# Patient Record
Sex: FEMALE | Race: WHITE | NOT HISPANIC OR LATINO | Employment: UNEMPLOYED | URBAN - METROPOLITAN AREA
[De-identification: names, ages, dates, MRNs, and addresses within clinical notes are randomized per-mention and may not be internally consistent; named-entity substitution may affect disease eponyms.]

---

## 2023-11-11 PROBLEM — F41.9 ANXIETY: Status: ACTIVE | Noted: 2023-11-11

## 2023-11-11 PROBLEM — N83.8: Status: ACTIVE | Noted: 2023-11-11

## 2023-11-11 PROBLEM — N94.6 DYSMENORRHEA: Status: ACTIVE | Noted: 2023-11-11

## 2023-11-11 PROBLEM — N80.129 ENDOMETRIOMA OF OVARY: Status: ACTIVE | Noted: 2023-11-11

## 2023-11-11 PROBLEM — Z87.42 HISTORY OF OVARIAN CYST: Status: ACTIVE | Noted: 2023-11-11

## 2023-11-11 PROBLEM — F32.A DEPRESSION: Status: ACTIVE | Noted: 2023-11-11

## 2023-11-11 PROBLEM — N80.9 ENDOMETRIOSIS: Status: ACTIVE | Noted: 2023-11-11

## 2023-11-11 RX ORDER — IBUPROFEN 600 MG/1
600 TABLET ORAL EVERY 6 HOURS
COMMUNITY
Start: 2019-06-25 | End: 2023-11-13 | Stop reason: ALTCHOICE

## 2023-11-11 RX ORDER — DOCUSATE SODIUM 100 MG/1
100 CAPSULE, LIQUID FILLED ORAL 2 TIMES DAILY
COMMUNITY
Start: 2019-06-25 | End: 2023-11-13 | Stop reason: ALTCHOICE

## 2023-11-11 RX ORDER — SUMATRIPTAN SUCCINATE 100 MG/1
100 TABLET ORAL
COMMUNITY
Start: 2022-02-28 | End: 2023-11-13 | Stop reason: ALTCHOICE

## 2023-11-11 RX ORDER — ACETAMINOPHEN 325 MG/1
2 TABLET ORAL
COMMUNITY
Start: 2019-06-25 | End: 2023-11-13 | Stop reason: ALTCHOICE

## 2023-11-11 RX ORDER — CLONAZEPAM 0.5 MG/1
TABLET ORAL
COMMUNITY
Start: 2022-03-06 | End: 2023-11-13 | Stop reason: ALTCHOICE

## 2023-11-11 RX ORDER — MEDROXYPROGESTERONE ACETATE 150 MG/ML
INJECTION, SUSPENSION INTRAMUSCULAR
COMMUNITY
End: 2024-03-19 | Stop reason: ALTCHOICE

## 2023-11-11 RX ORDER — OXYCODONE HYDROCHLORIDE 5 MG/1
5 TABLET ORAL EVERY 6 HOURS PRN
COMMUNITY
Start: 2019-06-25 | End: 2023-11-13 | Stop reason: ALTCHOICE

## 2023-11-13 ENCOUNTER — OFFICE VISIT (OUTPATIENT)
Dept: OBSTETRICS AND GYNECOLOGY | Facility: HOSPITAL | Age: 28
End: 2023-11-13
Payer: COMMERCIAL

## 2023-11-13 VITALS — WEIGHT: 186 LBS | BODY MASS INDEX: 27.47 KG/M2

## 2023-11-13 DIAGNOSIS — N80.9 ENDOMETRIOSIS: ICD-10-CM

## 2023-11-13 DIAGNOSIS — Z01.419 WELL WOMAN EXAM WITH ROUTINE GYNECOLOGICAL EXAM: Primary | ICD-10-CM

## 2023-11-13 PROBLEM — F41.0 GENERALIZED ANXIETY DISORDER WITH PANIC ATTACKS: Status: ACTIVE | Noted: 2020-03-13

## 2023-11-13 PROBLEM — F41.1 GENERALIZED ANXIETY DISORDER WITH PANIC ATTACKS: Status: ACTIVE | Noted: 2020-03-13

## 2023-11-13 PROBLEM — F33.1 MODERATE EPISODE OF RECURRENT MAJOR DEPRESSIVE DISORDER (MULTI): Status: ACTIVE | Noted: 2017-03-06

## 2023-11-13 PROBLEM — G43.709 CHRONIC MIGRAINE WITHOUT AURA WITHOUT STATUS MIGRAINOSUS, NOT INTRACTABLE: Status: ACTIVE | Noted: 2017-03-06

## 2023-11-13 PROCEDURE — 99395 PREV VISIT EST AGE 18-39: CPT | Performed by: OBSTETRICS & GYNECOLOGY

## 2023-11-13 PROCEDURE — 1036F TOBACCO NON-USER: CPT | Performed by: OBSTETRICS & GYNECOLOGY

## 2023-11-13 RX ORDER — MEDROXYPROGESTERONE ACETATE 150 MG/ML
150 INJECTION, SUSPENSION INTRAMUSCULAR SEE ADMIN INSTRUCTIONS
Status: DISCONTINUED | OUTPATIENT
Start: 2023-11-13 | End: 2024-04-16 | Stop reason: ALTCHOICE

## 2023-11-13 ASSESSMENT — ENCOUNTER SYMPTOMS
GASTROINTESTINAL NEGATIVE: 0
ALLERGIC/IMMUNOLOGIC NEGATIVE: 0
RESPIRATORY NEGATIVE: 0
HEMATOLOGIC/LYMPHATIC NEGATIVE: 0
EYES NEGATIVE: 0
CARDIOVASCULAR NEGATIVE: 0
ENDOCRINE NEGATIVE: 0
MUSCULOSKELETAL NEGATIVE: 0
CONSTITUTIONAL NEGATIVE: 0
NEUROLOGICAL NEGATIVE: 0
PSYCHIATRIC NEGATIVE: 0

## 2023-11-13 ASSESSMENT — PATIENT HEALTH QUESTIONNAIRE - PHQ9
2. FEELING DOWN, DEPRESSED OR HOPELESS: NOT AT ALL
1. LITTLE INTEREST OR PLEASURE IN DOING THINGS: NOT AT ALL
SUM OF ALL RESPONSES TO PHQ9 QUESTIONS 1 AND 2: 0

## 2023-11-13 NOTE — PROGRESS NOTES
Subjective   Patient ID: Namrata Mcmillan is a 28 y.o. female who presents for Annual Exam (Pt here for annual exam/Last pap 4/19/21/Lmp-IUD/Denies pain /Denies falls /Chaperone declined ).  Concerned about weight gain.   Currently on Depo-Provera for her endometriosis. Feels like is is making it harder to lose weight.   Feels like her hair is thinning.   Concerned about stopping it and dealing with her menses again.         Review of Systems   All other systems reviewed and are negative.      Objective   Physical Exam  Vitals reviewed.   Constitutional:       Appearance: Normal appearance.   HENT:      Head: Normocephalic and atraumatic.      Right Ear: External ear normal.      Left Ear: External ear normal.      Nose: Nose normal.      Mouth/Throat:      Mouth: Mucous membranes are moist.   Eyes:      Extraocular Movements: Extraocular movements intact.   Neck:      Thyroid: No thyroid mass or thyromegaly.   Cardiovascular:      Rate and Rhythm: Normal rate and regular rhythm.      Heart sounds: Normal heart sounds.   Pulmonary:      Effort: Pulmonary effort is normal.      Breath sounds: Normal breath sounds.   Chest:   Breasts:     Right: Normal.      Left: Normal.   Abdominal:      General: Abdomen is flat. Bowel sounds are normal.      Palpations: Abdomen is soft.      Tenderness: There is no abdominal tenderness. There is no right CVA tenderness or left CVA tenderness.   Genitourinary:     General: Normal vulva.      Exam position: Lithotomy position.      Labia:         Right: No lesion.         Left: No lesion.       Urethra: No prolapse, urethral swelling or urethral lesion.      Vagina: Normal.      Cervix: Normal.      Uterus: Normal.       Adnexa: Right adnexa normal and left adnexa normal.   Musculoskeletal:         General: Normal range of motion.      Right shoulder: Normal.      Left shoulder: Normal.      Cervical back: Normal, normal range of motion and neck supple.      Thoracic back: Normal.       Lumbar back: Normal.      Right hip: Normal.      Left hip: Normal.      Right upper leg: Normal.      Left upper leg: Normal.      Right knee: Normal.      Left knee: Normal.      Right lower leg: Normal.      Left lower leg: Normal.   Lymphadenopathy:      Upper Body:      Right upper body: No axillary adenopathy.      Left upper body: No axillary adenopathy.      Lower Body: No right inguinal adenopathy. No left inguinal adenopathy.   Skin:     General: Skin is warm and dry.   Neurological:      General: No focal deficit present.      Mental Status: She is alert and oriented to person, place, and time.      Cranial Nerves: Cranial nerves 2-12 are intact.      Motor: Motor function is intact.   Psychiatric:         Attention and Perception: Attention and perception normal.         Mood and Affect: Mood and affect normal.         Behavior: Behavior normal.         Cognition and Memory: Cognition normal.         Judgment: Judgment normal.         Assessment/Plan   Problem List Items Addressed This Visit             ICD-10-CM    Endometriosis N80.9    Relevant Medications    medroxyPROGESTERone (Depo-Provera) injection 150 mg    Other Relevant Orders    Follow Up In Gynecology     Other Visit Diagnoses         Codes    Well woman exam with routine gynecological exam    -  Primary Z01.419

## 2023-12-13 NOTE — PROGRESS NOTES
Division of Minimally Invasive Gynecologic Surgery  [facility]    12/13/23 Gynecology Consult     HISTORY OF PRESENT ILLNESS:  Namrata Mcmillan 28 y.o. P0 referred by Dr. Syed for path-proven endometriosis. Currently on Depo Provera but is struggling with weight gain on this option.     Amenorrheic on Depo, but does still have breakthrough bleeding and spotting. On the Depo, she has noticed changes in her hair (thinning) and issues with weight gain. She has not really tried any other options. She would prefer not to be on birth control indefinitely. Last Depo shot was about 1 month ago.     Menses prior to Depo were monthly, lasting about 5 days. First 2 days were severely painful with need to stay home from work/school. She even passed out once. She reports vomiting, pain w/ Bms, dyspareunia (both). Denies dysuria.     She did try Orilissa, but was only on this for a short period. She is interested in better understanding endometriosis and her options for treatment.     Imaging:   - Last pelvic US 2021 showed uterus measuring 5cm x 3cm x 4cm, bilateral ovaries unremarkable   Labs:    - TSH 2022 WNL   Screening:   - Last pap 2021 negative cytology, no hx of CIN2-3  PMHx: Henoch-Schonlein purpura (asymptomatic now, was a problem as a child), migraines w/ aura (but rare, less than once per year, off of meds), malignant melanoma   PSHx: laparoscopic bilateral cystectomy 2019 (endometriomas noted on path)     PAST MEDICAL HISTORY:  Past Medical History:   Diagnosis Date    Henoch-Schonlein purpura (CMS/HCC)     Migraine, unspecified, not intractable, without status migrainosus     Migraine    Personal history of malignant melanoma of skin     History of malignant melanoma     PAST SURGICAL HISTORY:  Past Surgical History:   Procedure Laterality Date    OVARIAN CYST REMOVAL Bilateral 2019    laparoscopic, path postive for endometriosis    SKIN BIOPSY       PHYSICAL  "EXAMINATION:  VITAL SIGNS: /90   Pulse (!) 112   Ht 1.753 m (5' 9\")   Wt 84.8 kg (187 lb)   BMI 27.62 kg/m²       Constitutional:  No acute distress, well-nourished and well-developed  HEENT: EOM grossly intact, MMM, neck supple and with full ROM  Pulm:  Effort normal. No accessory muscle usage.  No respiratory distress.  Neurological:  She is alert and oriented to person place and time.  Skin: Warm, no pallor.  Psychiatric:  She has normal mood and affect.    ASSESSMENT:  Namrata Mcmillan 28 y.o. P0 referred by Dr. Syed for path-proven endometriosis. Currently on Depo Provera but is struggling with weight gain on this option.   - We discussed today the multiple etiologies of chronic pelvic pain, including endometriosis, adenomyosis, GI etiologies, MSK etiologies, and centralization of pain. I am primarily suspicious of endometriosis in her case. We discussed the role of surgical excision in the management of endometriosis and the estimated 15-20% recurrence rate in the future.   - We discussed the natural history of endometriosis and the fact that hormonal suppression is thought to have a role in slowing disease progression and managing symptoms of endometriosis, but cannot definitively reverse or eliminate endometriosis.   - We reviewed multiple treatment modalities at length today, including medical, surgical, and alternative. She is most interested in a trial of progesterone only pill and PFPT. We did discuss the option of surgical excision, but both she and I agree that we may be able to defer this if we can improve pain control w/ better suppression and PT.     PLAN:  - Last Depo shot 1 month ago. Recommend starting Slynd in approximately one month. Reviewed patient savings program.   - PFPT referral placed, given website info  - She will let me know when she sets up her MyChart and I will send her info on yoga for pelvic pain  - RTC in 4 months, earlier PRN     Ally Mckeon, " MD  12/13/23  10:37 AM

## 2023-12-15 ASSESSMENT — ENCOUNTER SYMPTOMS
CONSTIPATION: 0
ARTHRALGIAS: 0
HEMATOCHEZIA: 0
WEIGHT LOSS: 0
HEADACHES: 0
NAUSEA: 1
HEMATURIA: 0
BELCHING: 0
FLATUS: 0
ANOREXIA: 0
VOMITING: 1
FREQUENCY: 0
DYSURIA: 0
FEVER: 0
DIARRHEA: 0
ABDOMINAL PAIN: 1
MYALGIAS: 1

## 2023-12-19 ENCOUNTER — APPOINTMENT (OUTPATIENT)
Dept: OBSTETRICS AND GYNECOLOGY | Facility: CLINIC | Age: 28
End: 2023-12-19
Payer: COMMERCIAL

## 2023-12-19 ENCOUNTER — OFFICE VISIT (OUTPATIENT)
Dept: OBSTETRICS AND GYNECOLOGY | Facility: CLINIC | Age: 28
End: 2023-12-19
Payer: COMMERCIAL

## 2023-12-19 VITALS
HEIGHT: 69 IN | WEIGHT: 187 LBS | DIASTOLIC BLOOD PRESSURE: 90 MMHG | BODY MASS INDEX: 27.7 KG/M2 | SYSTOLIC BLOOD PRESSURE: 132 MMHG | HEART RATE: 112 BPM

## 2023-12-19 DIAGNOSIS — R63.5 WEIGHT GAIN: ICD-10-CM

## 2023-12-19 DIAGNOSIS — R10.2 PELVIC PAIN: ICD-10-CM

## 2023-12-19 DIAGNOSIS — N80.9 ENDOMETRIOSIS: Primary | ICD-10-CM

## 2023-12-19 DIAGNOSIS — Z30.09 COUNSELING FOR BIRTH CONTROL, ORAL CONTRACEPTIVES: ICD-10-CM

## 2023-12-19 PROCEDURE — 99204 OFFICE O/P NEW MOD 45 MIN: CPT | Performed by: STUDENT IN AN ORGANIZED HEALTH CARE EDUCATION/TRAINING PROGRAM

## 2023-12-19 PROCEDURE — 99214 OFFICE O/P EST MOD 30 MIN: CPT | Performed by: STUDENT IN AN ORGANIZED HEALTH CARE EDUCATION/TRAINING PROGRAM

## 2023-12-19 PROCEDURE — 1036F TOBACCO NON-USER: CPT | Performed by: STUDENT IN AN ORGANIZED HEALTH CARE EDUCATION/TRAINING PROGRAM

## 2023-12-19 ASSESSMENT — ENCOUNTER SYMPTOMS
ENDOCRINE NEGATIVE: 1
HEMATOLOGIC/LYMPHATIC NEGATIVE: 0
ALLERGIC/IMMUNOLOGIC NEGATIVE: 0
CARDIOVASCULAR NEGATIVE: 0
MUSCULOSKELETAL NEGATIVE: 1
ENDOCRINE COMMENTS: LOSS OF HAIR
GASTROINTESTINAL NEGATIVE: 1
CONSTITUTIONAL NEGATIVE: 0
NEUROLOGICAL NEGATIVE: 1
ROS GI COMMENTS: PELVIC PAIN
EYES NEGATIVE: 0
RESPIRATORY NEGATIVE: 0
PSYCHIATRIC NEGATIVE: 0

## 2023-12-19 ASSESSMENT — PAIN SCALES - GENERAL: PAINLEVEL: 4

## 2024-03-15 ENCOUNTER — APPOINTMENT (OUTPATIENT)
Dept: RADIOLOGY | Facility: HOSPITAL | Age: 29
End: 2024-03-15
Payer: COMMERCIAL

## 2024-03-15 ENCOUNTER — HOSPITAL ENCOUNTER (EMERGENCY)
Facility: HOSPITAL | Age: 29
Discharge: HOME | End: 2024-03-16
Attending: INTERNAL MEDICINE
Payer: COMMERCIAL

## 2024-03-15 ENCOUNTER — APPOINTMENT (OUTPATIENT)
Dept: CARDIOLOGY | Facility: HOSPITAL | Age: 29
End: 2024-03-15
Payer: COMMERCIAL

## 2024-03-15 DIAGNOSIS — J20.8 VIRAL BRONCHITIS: Primary | ICD-10-CM

## 2024-03-15 LAB
ALBUMIN SERPL BCP-MCNC: 4.7 G/DL (ref 3.4–5)
ALP SERPL-CCNC: 74 U/L (ref 33–110)
ALT SERPL W P-5'-P-CCNC: 10 U/L (ref 7–45)
ANION GAP SERPL CALC-SCNC: 18 MMOL/L (ref 10–20)
APPEARANCE UR: ABNORMAL
AST SERPL W P-5'-P-CCNC: 15 U/L (ref 9–39)
B-HCG SERPL-ACNC: <2 MIU/ML
BACTERIA #/AREA URNS AUTO: ABNORMAL /HPF
BASOPHILS # BLD AUTO: 0.08 X10*3/UL (ref 0–0.1)
BASOPHILS NFR BLD AUTO: 0.8 %
BILIRUB SERPL-MCNC: 1.1 MG/DL (ref 0–1.2)
BILIRUB UR STRIP.AUTO-MCNC: NEGATIVE MG/DL
BNP SERPL-MCNC: 9 PG/ML (ref 0–99)
BUN SERPL-MCNC: 10 MG/DL (ref 6–23)
CALCIUM SERPL-MCNC: 10 MG/DL (ref 8.6–10.3)
CARDIAC TROPONIN I PNL SERPL HS: 3 NG/L (ref 0–13)
CHLORIDE SERPL-SCNC: 100 MMOL/L (ref 98–107)
CO2 SERPL-SCNC: 19 MMOL/L (ref 21–32)
COLOR UR: YELLOW
CREAT SERPL-MCNC: 0.84 MG/DL (ref 0.5–1.05)
D DIMER PPP FEU-MCNC: 343 NG/ML FEU
EGFRCR SERPLBLD CKD-EPI 2021: >90 ML/MIN/1.73M*2
EOSINOPHIL # BLD AUTO: 0.38 X10*3/UL (ref 0–0.7)
EOSINOPHIL NFR BLD AUTO: 4 %
ERYTHROCYTE [DISTWIDTH] IN BLOOD BY AUTOMATED COUNT: 12.1 % (ref 11.5–14.5)
FLUAV RNA RESP QL NAA+PROBE: NOT DETECTED
FLUBV RNA RESP QL NAA+PROBE: NOT DETECTED
GLUCOSE SERPL-MCNC: 104 MG/DL (ref 74–99)
GLUCOSE UR STRIP.AUTO-MCNC: NEGATIVE MG/DL
HCT VFR BLD AUTO: 44 % (ref 36–46)
HGB BLD-MCNC: 15.3 G/DL (ref 12–16)
IMM GRANULOCYTES # BLD AUTO: 0.04 X10*3/UL (ref 0–0.7)
IMM GRANULOCYTES NFR BLD AUTO: 0.4 % (ref 0–0.9)
KETONES UR STRIP.AUTO-MCNC: ABNORMAL MG/DL
LACTATE SERPL-SCNC: 2 MMOL/L (ref 0.4–2)
LEUKOCYTE ESTERASE UR QL STRIP.AUTO: ABNORMAL
LYMPHOCYTES # BLD AUTO: 1.16 X10*3/UL (ref 1.2–4.8)
LYMPHOCYTES NFR BLD AUTO: 12.2 %
MCH RBC QN AUTO: 29.3 PG (ref 26–34)
MCHC RBC AUTO-ENTMCNC: 34.8 G/DL (ref 32–36)
MCV RBC AUTO: 84 FL (ref 80–100)
MONOCYTES # BLD AUTO: 0.99 X10*3/UL (ref 0.1–1)
MONOCYTES NFR BLD AUTO: 10.4 %
MUCOUS THREADS #/AREA URNS AUTO: ABNORMAL /LPF
NEUTROPHILS # BLD AUTO: 6.83 X10*3/UL (ref 1.2–7.7)
NEUTROPHILS NFR BLD AUTO: 72.2 %
NITRITE UR QL STRIP.AUTO: NEGATIVE
NRBC BLD-RTO: 0 /100 WBCS (ref 0–0)
PH UR STRIP.AUTO: 5 [PH]
PLATELET # BLD AUTO: 311 X10*3/UL (ref 150–450)
POTASSIUM SERPL-SCNC: 3.7 MMOL/L (ref 3.5–5.3)
PROT SERPL-MCNC: 7.7 G/DL (ref 6.4–8.2)
PROT UR STRIP.AUTO-MCNC: NEGATIVE MG/DL
RBC # BLD AUTO: 5.23 X10*6/UL (ref 4–5.2)
RBC # UR STRIP.AUTO: ABNORMAL /UL
RBC #/AREA URNS AUTO: ABNORMAL /HPF
RSV RNA RESP QL NAA+PROBE: NOT DETECTED
SARS-COV-2 RNA RESP QL NAA+PROBE: NOT DETECTED
SODIUM SERPL-SCNC: 133 MMOL/L (ref 136–145)
SP GR UR STRIP.AUTO: 1.02
SQUAMOUS #/AREA URNS AUTO: ABNORMAL /HPF
UROBILINOGEN UR STRIP.AUTO-MCNC: <2 MG/DL
WBC # BLD AUTO: 9.5 X10*3/UL (ref 4.4–11.3)
WBC #/AREA URNS AUTO: ABNORMAL /HPF

## 2024-03-15 PROCEDURE — 94640 AIRWAY INHALATION TREATMENT: CPT | Mod: 59

## 2024-03-15 PROCEDURE — 87637 SARSCOV2&INF A&B&RSV AMP PRB: CPT | Performed by: INTERNAL MEDICINE

## 2024-03-15 PROCEDURE — 84702 CHORIONIC GONADOTROPIN TEST: CPT | Performed by: INTERNAL MEDICINE

## 2024-03-15 PROCEDURE — 83880 ASSAY OF NATRIURETIC PEPTIDE: CPT | Performed by: INTERNAL MEDICINE

## 2024-03-15 PROCEDURE — 94645 CONT INHLJ TX EACH ADDL HOUR: CPT

## 2024-03-15 PROCEDURE — 36415 COLL VENOUS BLD VENIPUNCTURE: CPT | Performed by: INTERNAL MEDICINE

## 2024-03-15 PROCEDURE — 94640 AIRWAY INHALATION TREATMENT: CPT

## 2024-03-15 PROCEDURE — 96375 TX/PRO/DX INJ NEW DRUG ADDON: CPT | Performed by: INTERNAL MEDICINE

## 2024-03-15 PROCEDURE — 85025 COMPLETE CBC W/AUTO DIFF WBC: CPT | Performed by: INTERNAL MEDICINE

## 2024-03-15 PROCEDURE — 99284 EMERGENCY DEPT VISIT MOD MDM: CPT | Mod: 25 | Performed by: INTERNAL MEDICINE

## 2024-03-15 PROCEDURE — 2500000004 HC RX 250 GENERAL PHARMACY W/ HCPCS (ALT 636 FOR OP/ED): Performed by: INTERNAL MEDICINE

## 2024-03-15 PROCEDURE — 87086 URINE CULTURE/COLONY COUNT: CPT | Mod: AHULAB | Performed by: INTERNAL MEDICINE

## 2024-03-15 PROCEDURE — 94664 DEMO&/EVAL PT USE INHALER: CPT

## 2024-03-15 PROCEDURE — 84484 ASSAY OF TROPONIN QUANT: CPT | Performed by: INTERNAL MEDICINE

## 2024-03-15 PROCEDURE — 81001 URINALYSIS AUTO W/SCOPE: CPT | Performed by: INTERNAL MEDICINE

## 2024-03-15 PROCEDURE — 96374 THER/PROPH/DIAG INJ IV PUSH: CPT | Performed by: INTERNAL MEDICINE

## 2024-03-15 PROCEDURE — 93005 ELECTROCARDIOGRAM TRACING: CPT

## 2024-03-15 PROCEDURE — 83605 ASSAY OF LACTIC ACID: CPT | Performed by: INTERNAL MEDICINE

## 2024-03-15 PROCEDURE — 2500000002 HC RX 250 W HCPCS SELF ADMINISTERED DRUGS (ALT 637 FOR MEDICARE OP, ALT 636 FOR OP/ED): Performed by: INTERNAL MEDICINE

## 2024-03-15 PROCEDURE — 71046 X-RAY EXAM CHEST 2 VIEWS: CPT

## 2024-03-15 PROCEDURE — 87040 BLOOD CULTURE FOR BACTERIA: CPT | Mod: 59,AHULAB | Performed by: INTERNAL MEDICINE

## 2024-03-15 PROCEDURE — 80053 COMPREHEN METABOLIC PANEL: CPT | Performed by: INTERNAL MEDICINE

## 2024-03-15 PROCEDURE — 85379 FIBRIN DEGRADATION QUANT: CPT | Performed by: INTERNAL MEDICINE

## 2024-03-15 PROCEDURE — 71046 X-RAY EXAM CHEST 2 VIEWS: CPT | Mod: FOREIGN READ | Performed by: RADIOLOGY

## 2024-03-15 RX ORDER — DIPHENHYDRAMINE HYDROCHLORIDE 50 MG/ML
25 INJECTION INTRAMUSCULAR; INTRAVENOUS ONCE
Status: COMPLETED | OUTPATIENT
Start: 2024-03-15 | End: 2024-03-15

## 2024-03-15 RX ORDER — KETOROLAC TROMETHAMINE 30 MG/ML
30 INJECTION, SOLUTION INTRAMUSCULAR; INTRAVENOUS ONCE
Status: COMPLETED | OUTPATIENT
Start: 2024-03-15 | End: 2024-03-15

## 2024-03-15 RX ORDER — ALBUTEROL SULFATE 0.83 MG/ML
2.5 SOLUTION RESPIRATORY (INHALATION) ONCE
Status: COMPLETED | OUTPATIENT
Start: 2024-03-15 | End: 2024-03-15

## 2024-03-15 RX ORDER — PROCHLORPERAZINE EDISYLATE 5 MG/ML
10 INJECTION INTRAMUSCULAR; INTRAVENOUS ONCE
Status: COMPLETED | OUTPATIENT
Start: 2024-03-15 | End: 2024-03-15

## 2024-03-15 RX ADMIN — PROCHLORPERAZINE EDISYLATE 10 MG: 5 INJECTION INTRAMUSCULAR; INTRAVENOUS at 23:12

## 2024-03-15 RX ADMIN — SODIUM CHLORIDE 1000 ML: 9 INJECTION, SOLUTION INTRAVENOUS at 20:31

## 2024-03-15 RX ADMIN — KETOROLAC TROMETHAMINE 30 MG: 30 INJECTION, SOLUTION INTRAMUSCULAR at 23:12

## 2024-03-15 RX ADMIN — DIPHENHYDRAMINE HYDROCHLORIDE 25 MG: 50 INJECTION, SOLUTION INTRAMUSCULAR; INTRAVENOUS at 23:13

## 2024-03-15 RX ADMIN — ALBUTEROL SULFATE 2.5 MG: 2.5 SOLUTION RESPIRATORY (INHALATION) at 21:49

## 2024-03-15 ASSESSMENT — COLUMBIA-SUICIDE SEVERITY RATING SCALE - C-SSRS
2. HAVE YOU ACTUALLY HAD ANY THOUGHTS OF KILLING YOURSELF?: NO
1. IN THE PAST MONTH, HAVE YOU WISHED YOU WERE DEAD OR WISHED YOU COULD GO TO SLEEP AND NOT WAKE UP?: NO
6. HAVE YOU EVER DONE ANYTHING, STARTED TO DO ANYTHING, OR PREPARED TO DO ANYTHING TO END YOUR LIFE?: NO

## 2024-03-16 VITALS
BODY MASS INDEX: 28.88 KG/M2 | SYSTOLIC BLOOD PRESSURE: 111 MMHG | TEMPERATURE: 96.7 F | HEART RATE: 91 BPM | HEIGHT: 69 IN | RESPIRATION RATE: 20 BRPM | OXYGEN SATURATION: 96 % | WEIGHT: 195 LBS | DIASTOLIC BLOOD PRESSURE: 80 MMHG

## 2024-03-16 LAB — HOLD SPECIMEN: NORMAL

## 2024-03-16 RX ORDER — BENZONATATE 100 MG/1
100 CAPSULE ORAL 3 TIMES DAILY PRN
Qty: 21 CAPSULE | Refills: 0 | Status: SHIPPED | OUTPATIENT
Start: 2024-03-16 | End: 2024-03-23

## 2024-03-16 RX ORDER — ALBUTEROL SULFATE 90 UG/1
2 AEROSOL, METERED RESPIRATORY (INHALATION) EVERY 4 HOURS PRN
Qty: 18 G | Refills: 0 | Status: SHIPPED | OUTPATIENT
Start: 2024-03-16 | End: 2024-04-15

## 2024-03-16 RX ORDER — PREDNISONE 20 MG/1
40 TABLET ORAL DAILY
Qty: 6 TABLET | Refills: 0 | Status: SHIPPED | OUTPATIENT
Start: 2024-03-16 | End: 2024-03-19

## 2024-03-16 NOTE — DISCHARGE INSTRUCTIONS
You were seen today for a viral bronchitis.  Your evaluation was not concerning for an emergency at this time.  We gave you a prescription for albuterol and a few days of steroids.  Please see the attached information sheet for information about your condition, how to care for your condition at home, and reasons to return to the emergency department. Take any prescriptions written today as prescribed. You should call your primary care provider within 24 hours to tell them about today's visit, including any new medications or medication changes, as he or she may want to see you in the office for further evaluation. If you do not have a primary care provider, call  [phone] for an appointment. We offer in-person office visits as well as virtual options. Please do not hesitate to call  [phone] or return to the emergency department with any new or unresolved concerns or symptoms. Thank you for choosing [facility] for your care.

## 2024-03-16 NOTE — ED PROVIDER NOTES
"HPI     CC: Shortness of Breath     HPI: Namrata Mcmillan is a 28 y.o. female with no significant past medical history presents with viral symptoms.  Symptoms started 2 days ago in the morning.  She had a runny nose, felt like she was \"underwater\", felt like her throat was hot.  She figured it was COVID because she had COVID before.  She took a COVID test and it was negative.  She has been taking Mucinex Tylenol with minimal relief.  Over time she developed some difficulty breathing, felt like it was difficult to get good breaths.  She has pain in the center of her chest radiating to the back mainly when she coughs.  She has some associated nausea.  She also has an associated headache, denies neck pain or stiffness.  Temperatures have been low-grade, around 99.  She went to urgent care today and was found to be tachycardic to the 120s, with a low-grade temp of 99.1 and so was referred to the ED.    ROS: 10-point review of systems was performed and is otherwise negative except as noted in HPI.    Limitations to history: N/A    Independent Historians: N/A    External Records Reviewed: Outpatient notes in EMR    Past Medical History: Noncontributory except per HPI     Past Surgical History: Noncontributory except per HPI     Family History: Reviewed and noncontributory     Social History:  Denies tobacco. Denies ETOH. Denies illicit drugs.    Social Determinants Affecting Care: N/A    No Known Allergies    Home Meds:   Current Outpatient Medications   Medication Instructions    albuterol 90 mcg/actuation inhaler 2 puffs, inhalation, Every 4 hours PRN    benzonatate (TESSALON) 100 mg, oral, 3 times daily PRN, Do not crush or chew.    drospirenone, contraceptive, 4 mg (28) tablet 1 tablet, oral, Daily    medroxyPROGESTERone (Depo-Provera) 150 mg/mL injection Inject into the muscle every 12 weeks.    predniSONE (DELTASONE) 40 mg, oral, Daily        Physical Exam     ED Triage Vitals [03/15/24 1939]   Temperature Heart " "Rate Respirations BP   35.9 °C (96.7 °F) (!) 142 20 (!) 124/106      Pulse Ox Temp src Heart Rate Source Patient Position   (!) 91 % -- -- --      BP Location FiO2 (%)     -- --         Heart Rate:  []   Temperature:  [35.9 °C (96.7 °F)]   Respirations:  [13-24]   BP: (100-125)/()   Height:  [175.3 cm (5' 9\")]   Weight:  [88.5 kg (195 lb)]   Pulse Ox:  [91 %-98 %]      Physical Exam  Vitals and nursing note reviewed.     CONSTITUTIONAL: Well appearing, well nourished, in no acute distress.   HENMT: Head atraumatic. Airway patent. Nasal mucosa clear. Mouth with normal mucosa, clear oropharynx. Uvula midline. Neck supple.    EYES: Clear bilaterally, pupils equally round and reactive to light.   CARDIOVASCULAR: Tachycardic, regular rhythm.  Heart sounds S1, S2.  No murmurs, rubs or gallops. Normal pulses. Capillary refill < 2 sec.   RESPIRATORY: No increased work of breathing. Breath sounds clear except for coarse cough.  GASTROINTESTINAL: Abdomen soft, non-distended, non-tender. No rebound, no guarding. Normal bowel sounds. No palpable masses.  GENITOURINARY:  No CVA tenderness.  MUSCULOSKELETAL: Spine appears normal, range of motion is not limited, no muscle or joint tenderness. No edema.   NEUROLOGICAL: Alert and oriented, no asymmetry, moving all extremities equally.  SKIN: Warm, dry and intact. No rash or notable lesions.  PSYCHIATRIC: Normal mood and affect.  HEME/LYMPH: No adenopathy or splenomegaly.    Diagnostic Results      ECG: ECG read interpreted by me.  Sinus tachycardia, rate 134.  Normal axis.  Normal intervals.  No significant ST or T wave derangements.    Labs Reviewed   CBC WITH AUTO DIFFERENTIAL - Abnormal       Result Value    WBC 9.5      nRBC 0.0      RBC 5.23 (*)     Hemoglobin 15.3      Hematocrit 44.0      MCV 84      MCH 29.3      MCHC 34.8      RDW 12.1      Platelets 311      Neutrophils % 72.2      Immature Granulocytes %, Automated 0.4      Lymphocytes % 12.2      Monocytes % " 10.4      Eosinophils % 4.0      Basophils % 0.8      Neutrophils Absolute 6.83      Immature Granulocytes Absolute, Automated 0.04      Lymphocytes Absolute 1.16 (*)     Monocytes Absolute 0.99      Eosinophils Absolute 0.38      Basophils Absolute 0.08     COMPREHENSIVE METABOLIC PANEL - Abnormal    Glucose 104 (*)     Sodium 133 (*)     Potassium 3.7      Chloride 100      Bicarbonate 19 (*)     Anion Gap 18      Urea Nitrogen 10      Creatinine 0.84      eGFR >90      Calcium 10.0      Albumin 4.7      Alkaline Phosphatase 74      Total Protein 7.7      AST 15      Bilirubin, Total 1.1      ALT 10     URINALYSIS WITH REFLEX CULTURE AND MICROSCOPIC - Abnormal    Color, Urine Yellow      Appearance, Urine Hazy (*)     Specific Gravity, Urine 1.019      pH, Urine 5.0      Protein, Urine NEGATIVE      Glucose, Urine NEGATIVE      Blood, Urine MODERATE (2+) (*)     Ketones, Urine 20 (1+) (*)     Bilirubin, Urine NEGATIVE      Urobilinogen, Urine <2.0      Nitrite, Urine NEGATIVE      Leukocyte Esterase, Urine TRACE (*)    MICROSCOPIC ONLY, URINE - Abnormal    WBC, Urine 6-10 (*)     RBC, Urine 3-5      Squamous Epithelial Cells, Urine 1-9 (SPARSE)      Bacteria, Urine 4+ (*)     Mucus, Urine 3+     LACTATE - Normal    Lactate 2.0      Narrative:     Venipuncture immediately after or during the administration of Metamizole may lead to falsely low results. Testing should be performed immediately  prior to Metamizole dosing.   TROPONIN I, HIGH SENSITIVITY - Normal    Troponin I, High Sensitivity 3      Narrative:     Less than 99th percentile of normal range cutoff-  Female and children under 18 years old <14 ng/L; Male <21 ng/L: Negative  Repeat testing should be performed if clinically indicated.     Female and children under 18 years old 14-50 ng/L; Male 21-50 ng/L:  Consistent with possible cardiac damage and possible increased clinical   risk. Serial measurements may help to assess extent of myocardial damage.      >50 ng/L: Consistent with cardiac damage, increased clinical risk and  myocardial infarction. Serial measurements may help assess extent of   myocardial damage.      NOTE: Children less than 1 year old may have higher baseline troponin   levels and results should be interpreted in conjunction with the overall   clinical context.     NOTE: Troponin I testing is performed using a different   testing methodology at [facility] than at other   [facility]. Direct result comparisons should only   be made within the same method.   B-TYPE NATRIURETIC PEPTIDE - Normal    BNP 9      Narrative:        <100 pg/mL - Heart failure unlikely  100-299 pg/mL - Intermediate probability of acute heart                  failure exacerbation. Correlate with clinical                  context and patient history.    >=300 pg/mL - Heart Failure likely. Correlate with clinical                  context and patient history.    BNP testing is performed using different testing methodology at [facility] than at other [facility]. Direct result comparisons should only be made within the same method.      SARS-COV-2 AND INFLUENZA A/B PCR - Normal    Flu A Result Not Detected      Flu B Result Not Detected      Coronavirus 2019, PCR Not Detected      Narrative:     This assay has received FDA Emergency Use Authorization (EUA) and  is only authorized for the duration of time that circumstances exist to justify the authorization of the emergency use of in vitro diagnostic tests for the detection of SARS-CoV-2 virus and/or diagnosis of COVID-19 infection under section 564(b)(1) of the Act, 21 U.S.C. 360bbb-3(b)(1). Testing for SARS-CoV-2 is only recommended for patients who meet current clinical and/or epidemiological criteria as defined by federal, state, or local public health directives. This assay is an in vitro diagnostic nucleic acid amplification test for the qualitative detection of SARS-CoV-2, Influenza  A, and Influenza B from nasopharyngeal specimens and has been validated for use at [facility]. Negative results do not preclude COVID-19 infections or Influenza A/B infections, and should not be used as the sole basis for diagnosis, treatment, or other management decisions. If Influenza A/B and RSV PCR results are negative, testing for Parainfluenza virus, Adenovirus and Metapneumovirus is routinely performed for [facility] pediatric oncology and intensive care inpatients, and is available on other patients by placing an add-on request.    RSV PCR - Normal    RSV PCR Not Detected      Narrative:     This assay is an FDA-cleared, in vitro diagnostic nucleic acid amplification test for the detection of RSV from nasopharyngeal specimens, and has been validated for use at [facility]. Negative results do not preclude RSV infections, and should not be used as the sole basis for diagnosis, treatment, or other management decisions. If Influenza A/B and RSV PCR results are negative, testing for Parainfluenza virus, Adenovirus and Metapneumovirus is routinely performed for pediatric oncology and intensive care inpatients at [facility], and is available on other patients by placing an add-on request.       D-DIMER, VTE EXCLUSION - Normal    D-Dimer, Quantitative VTE Exclusion 343      Narrative:     The VTE Exclusion D-Dimer assay is reported in ng/mL Fibrinogen Equivalent Units (FEU).    Per 's instructions for use, a value of less than 500 ng/mL (FEU) may help to exclude DVT or PE in outpatients when the assay is used with a clinical pretest probability assessment.(AE must utilize and document eCalc 'Wells Score Deep Vein Thrombosis Risk' for DVT exclusion only. Emergency Department should utilize  Guidelines for Emergency Department Use of the VTE Exclusion D-Dimer and Clinical Pretest probability assessment model for DVT or PE exclusion.)   HUMAN CHORIONIC GONADOTROPIN,  SERUM QUANTITATIVE - Normal    HCG, Beta-Quantitative <2      Narrative:      Total HCG measurement is performed using the Melisa Paterson Access   Immunoassay which detects intact HCG and free beta HCG subunit.    This test is not indicated for use as a tumor marker.   HCG testing is performed using a different test methodology at [facility] than other [facility]. Direct result comparison   should only be made within the same method.       BLOOD CULTURE   BLOOD CULTURE   URINE CULTURE   URINALYSIS WITH REFLEX CULTURE AND MICROSCOPIC    Narrative:     The following orders were created for panel order Urinalysis with Reflex Culture and Microscopic.  Procedure                               Abnormality         Status                     ---------                               -----------         ------                     Urinalysis with Reflex C...[[id number]]  Abnormal            Final result               Extra Urine Gray Tube[[id number]]                            In process                   Please view results for these tests on the individual orders.   EXTRA URINE GRAY TUBE         XR chest 2 views   Final Result   No acute pulmonary abnormality.   Signed by Dragan Wilson MD                    Randolph Coma Scale Score: 15                  Procedure  Procedures    ED Course & MDM   Assessment/Plan:   Namrata Mcmillan is a 28 y.o. female with no significant past medical history presents with viral symptoms.  She is notably tachycardic on presentation, mildly hypoxic, afebrile.  Presentation is concerning for likely viral symptoms drome, possible bronchitis.  Low suspicion acute pneumonia given absence adventitious sounds on lung exam.  Low suspicion ACS with nonischemic ECG.  Low suspicion PE but given mild hypoxia and tachycardia will obtain D-dimer.  Presentation atypical of dissection.  Treatment was initiated with an albuterol treatment for possible bronchitis, a liter bolus for her  tachycardia and dehydration, Compazine, Toradol, and Benadryl for her headache and pain symptoms.  Workup is notable for CBC without leukocytosis, normal H&H, normal platelets, normal lactate making sepsis highly unlikely, CMP with mild hyponatremia 133, mildly low bicarb 19, normal troponin, normal D-dimer 343, normal BNP, UA with trace leukocyte esterase and 6-10 WBCs, 4+ bacteria (low suspicion for UTI with absence of urinary symptoms but sent for culture).  Viral swabs are negative.  She feels significantly improved after albuterol nebulizer.  I ambulated her about the ED and she did not desaturate, maintaining saturations in the mid 90s.  Heart rate improved to 91.  Blood pressures have been stable.  She feels well to go home.  She was given prescriptions for albuterol and prednisone for possible bronchitis, Tessalon Perles for cough.  She was advised on alternating Tylenol and Motrin for pain.  Patient was discharged home with anticipatory guidance and strict return precautions.    Medications   sodium chloride 0.9 % bolus 1,000 mL (0 mL intravenous Stopped 3/15/24 2101)   albuterol 2.5 mg /3 mL (0.083 %) nebulizer solution 2.5 mg (2.5 mg nebulization Given 3/15/24 2149)   ketorolac (Toradol) injection 30 mg (30 mg intravenous Given 3/15/24 2312)   prochlorperazine (Compazine) injection 10 mg (10 mg intravenous Given 3/15/24 2312)   diphenhydrAMINE (BENADryl) injection 25 mg (25 mg intravenous Given 3/15/24 2313)        Diagnoses as of 03/16/24 0207   Viral bronchitis       Disposition:   DISCHARGE.  The patient was discharged with both verbal and written anticipatory guidance and strict return precautions. Discharge diagnosis, instructions and plan were discussed and understood. I emphasized the importance of following up with their primary care provider within 24-48 hours and with any referrals in the timeframe recommended. At the time of discharge the patient was comfortable and was in no apparent distress.  Patient is aware of diagnostic uncertainty and was notified though testing is negative here, there is a very small chance that pathology may be missed.  The patient understands these risks and the patient/family understood to call 911 or return immediately to the emergency department if the symptoms worsen or if they have any additional concerns.      FOLLOW UP  Primary care provider in 1-2 days.      ED Prescriptions       Medication Sig Dispense Start Date End Date Auth. Provider    albuterol 90 mcg/actuation inhaler Inhale 2 puffs every 4 hours if needed for wheezing. 18 g 3/16/2024 4/15/2024 Chayito Thompson MD    predniSONE (Deltasone) 20 mg tablet Take 2 tablets (40 mg) by mouth once daily for 3 days. 6 tablet 3/16/2024 3/19/2024 Chayito Thompson MD    benzonatate (Tessalon) 100 mg capsule Take 1 capsule (100 mg) by mouth 3 times a day as needed for cough for up to 7 days. Do not crush or chew. 21 capsule 3/16/2024 3/23/2024 MD Chayito Mcadams MD  EM/IM/Peds    This note was dictated by speech recognition. Minor errors in transcription may be present.     Chayito Thompson MD  03/16/24 0204

## 2024-03-17 LAB — BACTERIA UR CULT: NORMAL

## 2024-03-18 LAB
ATRIAL RATE: 134 BPM
P AXIS: 53 DEGREES
P OFFSET: 207 MS
P ONSET: 169 MS
PR INTERVAL: 124 MS
Q ONSET: 221 MS
QRS COUNT: 22 BEATS
QRS DURATION: 74 MS
QT INTERVAL: 280 MS
QTC CALCULATION(BAZETT): 418 MS
QTC FREDERICIA: 365 MS
R AXIS: 68 DEGREES
T AXIS: 18 DEGREES
T OFFSET: 361 MS
VENTRICULAR RATE: 134 BPM

## 2024-03-19 ENCOUNTER — OFFICE VISIT (OUTPATIENT)
Dept: PRIMARY CARE | Facility: CLINIC | Age: 29
End: 2024-03-19
Payer: COMMERCIAL

## 2024-03-19 VITALS
RESPIRATION RATE: 18 BRPM | HEIGHT: 69 IN | BODY MASS INDEX: 29.3 KG/M2 | HEART RATE: 118 BPM | OXYGEN SATURATION: 97 % | WEIGHT: 197.8 LBS | SYSTOLIC BLOOD PRESSURE: 118 MMHG | DIASTOLIC BLOOD PRESSURE: 80 MMHG | TEMPERATURE: 97.3 F

## 2024-03-19 DIAGNOSIS — G43.709 CHRONIC MIGRAINE WITHOUT AURA WITHOUT STATUS MIGRAINOSUS, NOT INTRACTABLE: Primary | ICD-10-CM

## 2024-03-19 PROCEDURE — 99213 OFFICE O/P EST LOW 20 MIN: CPT | Performed by: NURSE PRACTITIONER

## 2024-03-19 PROCEDURE — 1036F TOBACCO NON-USER: CPT | Performed by: NURSE PRACTITIONER

## 2024-03-19 RX ORDER — TOPIRAMATE 25 MG/1
TABLET ORAL
Qty: 72 TABLET | Refills: 5 | Status: SHIPPED | OUTPATIENT
Start: 2024-03-19 | End: 2024-04-16 | Stop reason: DRUGHIGH

## 2024-03-19 RX ORDER — TOPIRAMATE 50 MG/1
50 TABLET, FILM COATED ORAL 2 TIMES DAILY
Qty: 30 TABLET | Refills: 0 | Status: SHIPPED | OUTPATIENT
Start: 2024-03-19 | End: 2024-04-16 | Stop reason: DRUGHIGH

## 2024-03-19 RX ORDER — SUMATRIPTAN SUCCINATE 100 MG/1
TABLET ORAL
Qty: 27 TABLET | Refills: 1 | Status: SHIPPED | OUTPATIENT
Start: 2024-03-19 | End: 2024-04-16 | Stop reason: SDUPTHER

## 2024-03-19 ASSESSMENT — LIFESTYLE VARIABLES
HOW MANY STANDARD DRINKS CONTAINING ALCOHOL DO YOU HAVE ON A TYPICAL DAY: 1 OR 2
AUDIT-C TOTAL SCORE: 1
SKIP TO QUESTIONS 9-10: 1
HOW OFTEN DO YOU HAVE A DRINK CONTAINING ALCOHOL: MONTHLY OR LESS
HOW OFTEN DO YOU HAVE SIX OR MORE DRINKS ON ONE OCCASION: NEVER

## 2024-03-19 ASSESSMENT — ENCOUNTER SYMPTOMS
MIGRAINE HEADACHES: 1
VOMITING: 1

## 2024-03-19 ASSESSMENT — PATIENT HEALTH QUESTIONNAIRE - PHQ9
SUM OF ALL RESPONSES TO PHQ9 QUESTIONS 1 AND 2: 0
1. LITTLE INTEREST OR PLEASURE IN DOING THINGS: NOT AT ALL
2. FEELING DOWN, DEPRESSED OR HOPELESS: NOT AT ALL

## 2024-03-19 ASSESSMENT — PAIN SCALES - GENERAL: PAINLEVEL: 3

## 2024-03-19 NOTE — PROGRESS NOTES
"Subjective   Patient ID: Phyllis Mcmillan is a 28 y.o. female who presents for Headache (PT C/O WEEKLY HEADACHES. STATES CURRENT HEADACHE STARTED 48 HOURS AGO BUT IS SUBSIDING. PT ALSO REPORTS HEAD CONGESTION X 4 DAYS. ).    MIGRANES WORSE. INTEREING IN EVERY DAY ACTIVITIES, GETTING A HEADACH WEEKLY, CAN TELL WHEN THEY ARE COMING ON. LASTING 10 HOURS SLEEP HELPS. TOOK TOPAMAX HELPFUL, WAS ON THATFOR AWHILE,     Headache   This is a recurrent problem. The current episode started more than 1 month ago. The problem occurs intermittently. The problem has been gradually worsening. The pain is located in the Bilateral region. The pain quality is similar to prior headaches. The quality of the pain is described as aching and pulsating. The pain is at a severity of 10/10. The pain is moderate. Associated symptoms include vomiting. She has tried acetaminophen for the symptoms. Her past medical history is significant for migraine headaches.        Review of Systems   Gastrointestinal:  Positive for vomiting.       Objective   /80   Pulse (!) 118   Temp 36.3 °C (97.3 °F)   Resp 18   Ht 1.753 m (5' 9\")   Wt 89.7 kg (197 lb 12.8 oz)   LMP  (LMP Unknown) Comment: CONTINUOUS BIRTH CONTROL  SpO2 97%   BMI 29.21 kg/m²     Physical Exam  Constitutional:       General: She is not in acute distress.     Appearance: Normal appearance.   Cardiovascular:      Rate and Rhythm: Normal rate and regular rhythm.      Heart sounds: No murmur heard.  Pulmonary:      Breath sounds: Normal breath sounds. No wheezing.   Musculoskeletal:         General: Normal range of motion.   Skin:     General: Skin is warm.   Neurological:      General: No focal deficit present.      Mental Status: She is alert and oriented to person, place, and time.   Psychiatric:         Behavior: Behavior normal.       Assessment/Plan   Problem List Items Addressed This Visit             ICD-10-CM    Chronic migraine without aura without status migrainosus, not " intractable - Primary G43.709

## 2024-03-19 NOTE — PATIENT INSTRUCTIONS
Discharge plan of care/case management plan validated with provider discharge order. I have reviewed discharge instructions with the patient. The patient verbalized understanding. IV site removed    Discharge instructions provided to patient and daughter. HAS BEEN Off meds  FOR A WHILE Migranes .  WERE CONTROLED NOW HAVING NEW STRESSES, WORKING HOUSE SITTING will restart meds that worked in past. Carlyle recheck in 4 weeks

## 2024-03-20 LAB
BACTERIA BLD CULT: NORMAL
BACTERIA BLD CULT: NORMAL

## 2024-04-16 ENCOUNTER — OFFICE VISIT (OUTPATIENT)
Dept: PRIMARY CARE | Facility: CLINIC | Age: 29
End: 2024-04-16
Payer: COMMERCIAL

## 2024-04-16 VITALS
BODY MASS INDEX: 28.64 KG/M2 | WEIGHT: 193.4 LBS | TEMPERATURE: 97.7 F | DIASTOLIC BLOOD PRESSURE: 78 MMHG | SYSTOLIC BLOOD PRESSURE: 118 MMHG | HEART RATE: 89 BPM | HEIGHT: 69 IN | OXYGEN SATURATION: 98 % | RESPIRATION RATE: 18 BRPM

## 2024-04-16 DIAGNOSIS — G43.909 ACUTE MIGRAINE: Primary | ICD-10-CM

## 2024-04-16 DIAGNOSIS — G43.709 CHRONIC MIGRAINE WITHOUT AURA WITHOUT STATUS MIGRAINOSUS, NOT INTRACTABLE: ICD-10-CM

## 2024-04-16 PROCEDURE — 99213 OFFICE O/P EST LOW 20 MIN: CPT | Performed by: NURSE PRACTITIONER

## 2024-04-16 PROCEDURE — 1036F TOBACCO NON-USER: CPT | Performed by: NURSE PRACTITIONER

## 2024-04-16 PROCEDURE — G0463 HOSPITAL OUTPT CLINIC VISIT: HCPCS | Performed by: NURSE PRACTITIONER

## 2024-04-16 RX ORDER — TOPIRAMATE 50 MG/1
50 TABLET, FILM COATED ORAL 2 TIMES DAILY
Qty: 180 TABLET | Refills: 1 | Status: SHIPPED | OUTPATIENT
Start: 2024-04-16

## 2024-04-16 RX ORDER — SUMATRIPTAN SUCCINATE 100 MG/1
TABLET ORAL
Qty: 27 TABLET | Refills: 2 | Status: SHIPPED | OUTPATIENT
Start: 2024-04-16

## 2024-04-16 ASSESSMENT — LIFESTYLE VARIABLES
AUDIT-C TOTAL SCORE: 1
HOW MANY STANDARD DRINKS CONTAINING ALCOHOL DO YOU HAVE ON A TYPICAL DAY: 1 OR 2
SKIP TO QUESTIONS 9-10: 1
HOW OFTEN DO YOU HAVE A DRINK CONTAINING ALCOHOL: MONTHLY OR LESS
HOW OFTEN DO YOU HAVE SIX OR MORE DRINKS ON ONE OCCASION: NEVER

## 2024-04-16 ASSESSMENT — ENCOUNTER SYMPTOMS: HEADACHES: 1

## 2024-04-16 ASSESSMENT — PAIN SCALES - GENERAL: PAINLEVEL: 0-NO PAIN

## 2024-04-16 NOTE — PROGRESS NOTES
"Subjective   Patient ID: Phyllis Mcmillan is a 28 y.o. female who presents for Follow-up (PT IS HERE FOR 1 MONTH FOLLOW UP. STATES MIGRAINES ARE IMPROVED ON MEDICATIONS).    HAS migraines, on med's here to evaluate effectivness  of med's, has had less migranes can tellwhen coming. Taking meds         Review of Systems   Neurological:  Positive for headaches.       Objective   /78   Pulse 89   Temp 36.5 °C (97.7 °F)   Resp 18   Ht 1.753 m (5' 9\")   Wt 87.7 kg (193 lb 6.4 oz)   LMP  (LMP Unknown) Comment: CONTINUOUS BIRTH CONTROL  SpO2 98%   BMI 28.56 kg/m²     Physical Exam  Constitutional:       Appearance: Normal appearance. She is normal weight.   Cardiovascular:      Rate and Rhythm: Normal rate.   Neurological:      Mental Status: She is alert.      Comments: Migranes less often less severe, meds helpful. Taking meds as prescibed. Overall quality of life improved,          Assessment/Plan   Problem List Items Addressed This Visit             ICD-10-CM    Chronic migraine without aura without status migrainosus, not intractable G43.709    Relevant Medications    topiramate (Topamax) 50 mg tablet    SUMAtriptan (Imitrex) 100 mg tablet     Other Visit Diagnoses         Codes    Acute migraine    -  Primary G43.909               " 

## 2024-04-16 NOTE — PATIENT INSTRUCTIONS
Pastor on med. Sent scriptd\s. Discussed if not controlled mor often quality of life getting worse return . Lots of new meds on market to try .

## 2024-10-24 DIAGNOSIS — G43.709 CHRONIC MIGRAINE WITHOUT AURA WITHOUT STATUS MIGRAINOSUS, NOT INTRACTABLE: ICD-10-CM

## 2024-10-28 RX ORDER — TOPIRAMATE 50 MG/1
50 TABLET, FILM COATED ORAL 2 TIMES DAILY
Qty: 180 TABLET | Refills: 1 | Status: SHIPPED | OUTPATIENT
Start: 2024-10-28

## 2024-11-20 ASSESSMENT — ENCOUNTER SYMPTOMS
NAUSEA: 0
DIARRHEA: 0
WEIGHT LOSS: 0
FLATUS: 0
HEMATURIA: 0
MYALGIAS: 0
HEADACHES: 0
VOMITING: 0
BELCHING: 0
ANOREXIA: 0
CONSTIPATION: 0
HEMATOCHEZIA: 0
ABDOMINAL PAIN: 1
ARTHRALGIAS: 0
DYSURIA: 0
FREQUENCY: 0
FEVER: 0

## 2024-11-25 NOTE — PROGRESS NOTES
Division of Minimally Invasive Gynecologic Surgery  [facility]    11/25/24 Gynecology Visit    CC: No chief complaint on file.      Namrata Mcmillan is a 29 y.o. P0 w/ path-proven endometriosis presents in follow up for same. At last visit, she was transitioned to Slynd and referred to PFPT. Last seen 12/2023.     Has not had any bleeding on Slynd. Still having cyclic monthly pelvic pain and having a hard time with weight loss. Pain to the point she will get nauseous and tired. Has not tried PFPT. Doing stretches at home during pain, which has helped. Trying to stay active. Will take Motrin for pain. Tried licorice root and tumeric. Does not want to be on birth control indefinitely, but scared of pain and bleeding off of birth control. Avoiding intercourse for the last year d/t concerns with pain.    She has tried:  - Depo Provera: weight gain, thinning hair   - Orilissa: only tried for a very short time     Imaging:   - Last pelvic US 2021 showed uterus measuring 5cm x 3cm x 4cm, bilateral ovaries unremarkable   Labs:    - TSH 2022 WNL   Screening:   - Last pap 2021 negative cytology, no hx of CIN2-3  PMHx: Henoch-Schonlein purpura (asymptomatic now, was a problem as a child), migraines w/ aura (but rare, less than once per year, off of meds), malignant melanoma   PSHx: laparoscopic bilateral cystectomy 2019 (endometriomas noted on path)     PMHx, PSHx, SHx, Allergies, and Medications updated in Epic.    ROS: reviewed and negative    PE: /85   Wt 86.6 kg (191 lb)   BMI 28.21 kg/m²    Constitutional:  No acute distress, well-nourished and well-developed  HEENT: EOM grossly intact, MMM, neck supple and with full ROM  Pulm:  Effort normal. No accessory muscle usage.  No respiratory distress.  Neurological:  She is alert and oriented to person place and time.  Skin: Warm, no pallor.  Psychiatric:  She has normal mood and affect.    A/P: Namrata Mcmillan is a 29 y.o. P0 w/  path-proven endometriosis presents in follow up for same. At last visit, she was transitioned to Slynd and referred to PFPT. Last seen 12/2023.   - Continue Slynd  - Discussed importance of PFPT to manage dyspareunia and associated fear of sex/pelvic exam. Reviewed that internal pelvic exam is not mandatory for participation in PFPT. Pt open to trial of PFPT.   - Discussed options for management of cyclic pelvic pain. Would recommend MRI pelvis as last pelvic imaging was in 2021. Recommend MRI as it is a better imaging modality for endometriosis. Additionally, pt is unlikely to tolerate transvaginal portion of US exam due to pelvic pain and US w/ abdominal component only will not be adequate imaging.   - Discussed option of addition of Orilissa vs vaginal Valium PRN on cyclic pain days. Discussed risks/benefits of each. She would prefer to avoid Orilissa due to potential for mood changes but is open to a trial of vaginal Valium. Safe use of vaginal Valium reviewed.     Plan:  - Trial of vaginal Valium PRN on cyclic pain days  - PFPT  - MRI pelvis  - RTC 3 months, earlier PRN     Юлия More MD  Seen and discussed w/ Dr. Mckeon     I saw and evaluated the patient. I personally obtained the key and critical portions of the history and physical exam or was physically present for key and critical portions performed by the resident/fellow. I reviewed the resident/fellow's documentation and discussed the patient with the resident/fellow. I agree with the resident/fellow's medical decision making as documented in the note.    Ally Mckeon MD  Division of Minimally Invasive Gynecologic Surgery  [facility]    Answers submitted by the patient for this visit:  Abdominal Pain Questionnaire (Submitted on 11/20/2024)  Chief Complaint: Abdominal pain  Chronicity: recurrent  Onset: more than 1 year ago  Onset quality: sudden  Frequency: every several days  Episode duration: 12  Hours  Progression since onset: waxing and waning  Pain location: suprapubic region  Pain - numeric: 7/10  Pain quality: aching, cramping, sharp  Radiates to: back, pelvis  anorexia: No  arthralgias: No  belching: No  constipation: No  diarrhea: No  dysuria: No  fever: No  flatus: No  frequency: No  headaches: No  hematochezia: No  hematuria: No  melena: No  myalgias: No  nausea: No  weight loss: No  vomiting: No  Aggravated by: certain positions, movement, vomiting  Relieved by: being still, palpation, recumbency

## 2024-11-26 ENCOUNTER — OFFICE VISIT (OUTPATIENT)
Dept: OBSTETRICS AND GYNECOLOGY | Facility: CLINIC | Age: 29
End: 2024-11-26
Payer: COMMERCIAL

## 2024-11-26 VITALS — WEIGHT: 191 LBS | DIASTOLIC BLOOD PRESSURE: 85 MMHG | SYSTOLIC BLOOD PRESSURE: 129 MMHG | BODY MASS INDEX: 28.21 KG/M2

## 2024-11-26 DIAGNOSIS — R10.2 PELVIC PAIN: ICD-10-CM

## 2024-11-26 DIAGNOSIS — N80.9 ENDOMETRIOSIS DETERMINED BY LAPAROSCOPY: Primary | ICD-10-CM

## 2024-11-26 PROCEDURE — 99214 OFFICE O/P EST MOD 30 MIN: CPT | Performed by: STUDENT IN AN ORGANIZED HEALTH CARE EDUCATION/TRAINING PROGRAM

## 2024-11-26 ASSESSMENT — ENCOUNTER SYMPTOMS
ENDOCRINE NEGATIVE: 0
CARDIOVASCULAR NEGATIVE: 0
PSYCHIATRIC NEGATIVE: 0
NEUROLOGICAL NEGATIVE: 0
CONSTITUTIONAL NEGATIVE: 0
GASTROINTESTINAL NEGATIVE: 0
RESPIRATORY NEGATIVE: 0
MUSCULOSKELETAL NEGATIVE: 0
ALLERGIC/IMMUNOLOGIC NEGATIVE: 0
EYES NEGATIVE: 0
HEMATOLOGIC/LYMPHATIC NEGATIVE: 0

## 2025-01-02 DIAGNOSIS — N80.9 ENDOMETRIOSIS: ICD-10-CM

## 2025-01-14 ENCOUNTER — APPOINTMENT (OUTPATIENT)
Dept: PHYSICAL THERAPY | Facility: CLINIC | Age: 30
End: 2025-01-14
Payer: COMMERCIAL

## 2025-01-28 ENCOUNTER — EVALUATION (OUTPATIENT)
Dept: PHYSICAL THERAPY | Facility: CLINIC | Age: 30
End: 2025-01-28
Payer: COMMERCIAL

## 2025-01-28 DIAGNOSIS — R10.2 PELVIC PAIN IN FEMALE: Primary | ICD-10-CM

## 2025-01-28 DIAGNOSIS — N80.9 ENDOMETRIOSIS: ICD-10-CM

## 2025-01-28 DIAGNOSIS — M62.89 MUSCLE TIGHTNESS: ICD-10-CM

## 2025-01-28 PROCEDURE — 97112 NEUROMUSCULAR REEDUCATION: CPT | Mod: GP | Performed by: PHYSICAL THERAPIST

## 2025-01-28 PROCEDURE — 97162 PT EVAL MOD COMPLEX 30 MIN: CPT | Mod: GP | Performed by: PHYSICAL THERAPIST

## 2025-01-28 PROCEDURE — 97535 SELF CARE MNGMENT TRAINING: CPT | Mod: GP | Performed by: PHYSICAL THERAPIST

## 2025-01-28 NOTE — PROGRESS NOTES
"Physical Therapy  Physical Therapy Pelvic Floor Evaluation    Name: Namrata Mcmillan \"Marquita"  MRN: [id number]  : 1995  Encounter Date: 2025  Visit Count: 1     Time Calculation  Start Time: 1130  Stop Time: 1230  Time Calculation (min): 60 min    [facility] visits  Visit # 1 of 30 for     Current Problem:  1. Pelvic pain in female  Follow Up In Physical Therapy      2. Endometriosis  Follow Up In Physical Therapy      3. Muscle tightness  Follow Up In Physical Therapy          General  Reason for Referral: pelvic pain  Referred By: Linda Rayo  STEADI Fall Risk Score (The score of 4 or more indicates an increased risk of falling): 0  Precautions Comment: endometriosis  Vital Signs     Pain       Subjective  Chief Complaint:   - diagnosed with endometriosis  - pain interfering with life  - birth control assists with management, but long-term doesn't want to be on this medication (no bleeding)  - pain: cramping, into the back, occassional into buttock/leg, deeper pain into the pelvis (every other day)  - cramping increases nausea/vomiting & diarrhea  - has gained a lot of weight over the past couple of years (medications) - 160s when most comfortable  Onset: years  MOISES: unknown    Current Condition: better    PAIN  Intensity (0-10): 0-4s  Location: lower back, into buttock area  Description: aching/cramping/sharp    Aggravating Factors: unsure  Relieving Factors: heating pad, ibuprofen (unsure if helpful), lying down, hydration, stretching    Relevant Information (PMH & Previous Tests/Imaging): see chart  Previous Interventions/Treatments: NA    Prior Level of Function (PLOF)  Exercise/Physical Activity: walking (40min, outside), alida exercises   Work/School: caregiver    Treatment Goals: reduce pain and improve pain management    Cultural or Spiritual Practices: no  History of Trauma: no - medical trauma  Safe at Home: yes    OB/GYN HISTORY:   Pregnancies " (): 0    Painful [place] or vaginal penetration? Yes, penetration, speculum painful (tensing up), more recent  - at least 1 year    BLADDER  Frequency of Urination  Daytime: every couple of hours  Nighttime: x1-2  Screening = Recurrent infections/UTIs? Yes  Other Symptoms   Trouble initiating urine stream    Urine stream is intermittent or slow   X Trouble emptying bladder completely - secondary urge    Dribbling after urination    Straining or pushing to empty    Trouble feeling bladder urge/fullness   X Trouble holding urine when you get an urge - occasional   Urinary Incontinence/Leakage  Frequency (day/week/month)? Infrequent  Present with cough and/or sneeze? Yes, relatively new  Present with physical activity and/or exertion? No  How much urine do you leak? A few drops  Average Fluid Intake (glasses/day): water (large tiffany jar, 4 cups, 2-3/day), tea occasional   Stopped drinking pop    BOWEL  Frequency of Bowel Movements: regular, daily   Average Fiber Intake (per day): fruits/veggies, whole grains      Objective  Patient was educated on pelvic floor anatomy, function, and dysfunction.   Patient was educated on an orthopedic assessment & external pelvic floor assessment technique and verbalized consent.   The patient is aware they have full autonomy and can stop the assessment at any time.     Standing Assessment:   Posture: rounded shoulders  SL Stance: wnl  DL Squat: decreased depth  Standing Lumbar Mobility: min limitation all directions    Supine Movement Assessment   SLR: mild pelvic rocking  Bridge: lumbar extension  Hip PROM: wnl  MMT: sup hip: 4/5 B    Supine Mobility Assessment  - Hamstrings: mod tightness  - Piriformis: mild tightness    Diaphragmatic Breathing: moderate  Rib Palpation/Positioning: flared  Assess Abdomen  Obliques: lower tenderness/tightness  Iliopsoas: tenderness/tightness    OUTCOMES MEASURE:  Belkys Pelvic Dysfunction Screening Tool : positive    NIH-CPSI  -Pain: 11  -  Urinary Symp: 2  - QOL: 7    Goals:   Active       PT Problem       PT Goal 1       Start:  01/28/25    Expected End:  04/28/25       Patient will return to prior level of function with minimal to no pain and without limitations for participation in ADLs, health, and exercise.   Patient demonstrate home exercise program adherence to supplement symptom reduction and functional gains made in clinic  Patient will reports minimal to no pain for participation in ADLs and return to PLOF.   Patient will demonstrate coordination of pelvic floor, strength & relaxation wnl for return to ADLs and PLOF without restriction.   Pt will demonstrate improvement on the outcome measure score to demonstrate overall improved functional abilities and quality of life.              Education: home exercise program, plan of care, activity modifications, pain management, and injury pathology  Bladder Habits: Just in Case Pee, Hover over the toilet, relax & breathe, squatty potty use  Hydration Recommendation: 50% of your body wt (pounds) in fluid ounces  Bladder Irritants: caffeine, artificial sweeteners, carbonated beverages, alcohol  Fiber Intake Recommendation: 25-30g/day    Treatments:   Education: home exercise program, plan of care, activity modifications, pain management, and injury pathology    Access Code: [email or web address]  - Supine Diaphragmatic Breathing  - 1-3 x daily - 3-5  breaths hold    Tx Codes:    Eval Mod   Self Care x1   NM x1    Plan for Next Visit:  hip/core/PF stretching, relaxing, lengthening    Assessment: Patient presents with signs and symptoms consistent with pelvic pain, muscle tightness, resulting in limited participation in pain-free ADLs and inability to perform at their prior level of function. Pt would benefit from physical therapy to address the impairments found & listed previously in the objective section in order to return to safe and pain-free ADLs and prior level of function.   PT Assessment Results: muscle  tightness   Rehab Prognosis: good   Clinical Presentation: stable    Plan:   Planned Interventions include: therapeutic exercise, self-care home management, manual therapy, therapeutic activities, gait training, neuromuscular coordination, aquatic therapy  Patient and/or family understands and agrees with goals and plan documented: yes  Frequency: 2x/month  Duration: 2-4 months     Mignon Martinez, PT

## 2025-02-10 ENCOUNTER — APPOINTMENT (OUTPATIENT)
Dept: PHYSICAL THERAPY | Facility: CLINIC | Age: 30
End: 2025-02-10
Payer: COMMERCIAL

## 2025-02-14 ENCOUNTER — TREATMENT (OUTPATIENT)
Dept: PHYSICAL THERAPY | Facility: CLINIC | Age: 30
End: 2025-02-14
Payer: COMMERCIAL

## 2025-02-14 DIAGNOSIS — R10.2 PELVIC PAIN: ICD-10-CM

## 2025-02-14 DIAGNOSIS — N80.9 ENDOMETRIOSIS DETERMINED BY LAPAROSCOPY: ICD-10-CM

## 2025-02-14 PROCEDURE — 97535 SELF CARE MNGMENT TRAINING: CPT | Mod: GP | Performed by: PHYSICAL THERAPIST

## 2025-02-14 PROCEDURE — 97112 NEUROMUSCULAR REEDUCATION: CPT | Mod: GP | Performed by: PHYSICAL THERAPIST

## 2025-02-14 PROCEDURE — 97110 THERAPEUTIC EXERCISES: CPT | Mod: GP | Performed by: PHYSICAL THERAPIST

## 2025-02-14 NOTE — PROGRESS NOTES
"Physical Therapy  Physical Therapy Pelvic Floor Evaluation    Name: Namrata Mcmillan \"Marquita"  MRN: [id number]  : 1995  Encounter Date: 2025  Visit Count: 2     Time Calculation  Start Time: 1340  Stop Time: 1430  Time Calculation (min): 50 min    [facility]: [facility] after 8 visits  Visit # 2 of 30 for     Current Problem:  1. Endometriosis determined by laparoscopy  Referral to Physical Therapy      2. Pelvic pain  Referral to Physical Therapy          General     Precautions     Vital Signs     Pain       Subjective  Chief Complaint:   - diagnosed with endometriosis  - pain interfering with life  - birth control assists with management, but long-term doesn't want to be on this medication (no bleeding)  - pain: cramping, into the back, occassional into buttock/leg, deeper pain into the pelvis (every other day)  - cramping increases nausea/vomiting & diarrhea  - has gained a lot of weight over the past couple of years (medications) - 160s when most comfortable  Onset: years  MOISES: unknown    25:   - this week, starting Monday, waking up with pain, urgency to urinate/bowel movements  - lower back in pain the most, creeping up to mid back the most  - noticing instinct to hold breath with pain  - hep compliance: yes, working on the breathing    PAIN  Intensity (0-10): 4  Location: lower back, into buttock area  Description: aching/cramping/sharp    Aggravating Factors: unsure  Relieving Factors: heating pad, ibuprofen (unsure if helpful), lying down, hydration, stretching    Relevant Information (PMH & Previous Tests/Imaging): see chart  Previous Interventions/Treatments: NA    Prior Level of Function (PLOF)  Exercise/Physical Activity: walking (40min, outside), alida exercises   Work/School: caregiver    Treatment Goals: reduce pain and improve pain management    Cultural or Spiritual Practices: no  History of Trauma: no - medical trauma  Safe at Home: yes    GYN HISTORY:   Painful " [place] or vaginal penetration? Yes, penetration, speculum painful (tensing up), more recent  - at least 1 year    BLADDER  Frequency of Urination  Daytime: every couple of hours  Nighttime: x1-2  Screening = Recurrent infections/UTIs? Yes  Other Symptoms   Trouble initiating urine stream    Urine stream is intermittent or slow   X Trouble emptying bladder completely - secondary urge    Dribbling after urination    Straining or pushing to empty    Trouble feeling bladder urge/fullness   X Trouble holding urine when you get an urge - occasional   Urinary Incontinence/Leakage  Frequency (day/week/month)? Infrequent  Present with cough and/or sneeze? Yes, relatively new  Present with physical activity and/or exertion? No  How much urine do you leak? A few drops  Average Fluid Intake (glasses/day): water (large tiffany jar, 4 cups, 2-3/day), tea occasional   Stopped drinking pop    Objective  Posture: rounded shoulders  SL Stance: wnl  DL Squat: decreased depth  Standing Lumbar Mobility: min limitation all directions  SLR: mild pelvic rocking  Bridge: lumbar extension  Hip PROM: wnl  MMT: sup hip: 4/5 B  - Hamstrings: mod tightness  - Piriformis: mild tightness  Diaphragmatic Breathing: moderate  Rib Palpation/Positioning: flared  Assess Abdomen  Obliques: lower tenderness/tightness  Iliopsoas: tenderness/tightness    Treatments:   Education: home exercise program, plan of care, activity modifications, pain management, and injury pathology  Vagus Nerve Stimulation/Down-Regulation: meditation, breath-work, guided imagery, progressive relaxation    Access Code: [email or web address]  - Supine Diaphragmatic Breathing  - 1-3 x daily - 3-5  breaths hold  - Supine Lower Trunk Rotation  - 3-5 x weekly - 10-30 reps  - Hooklying Single Knee to Chest Stretch  - 3-5 x weekly - 3 sets - 10 seconds hold  - Supine Pelvic Floor Stretch  - 3-5 x weekly - 3 sets - 5 breaths hold  - Supine Gluteus Stretch  - 3-5 x weekly - 3 sets - 20 seconds  hold  - Supine Figure 4 Piriformis Stretch  - 3-5 x weekly - 3 sets - 20 seconds hold  - Supine Hamstring Stretch with Strap  - 3-5 x weekly - 3 sets - 20 seconds hold  - Supine Butterfly Groin Stretch  - 3-5 x weekly - 3 sets - 20 seconds hold    Tx Codes:    There Ex x2   Self Care x1    Plan for Next Visit:  hip/core/PF stretching, relaxing, lengthening    Assessment: Patient presents with signs and symptoms consistent with pelvic pain, muscle tightness, resulting in limited participation in pain-free ADLs and inability to perform at their prior level of function. Pt would benefit from physical therapy to address the impairments found & listed previously in the objective section in order to return to safe and pain-free ADLs and prior level of function.   PT Assessment Results: muscle tightness   Rehab Prognosis: good   Clinical Presentation: stable  - tolerated progression of exercises with breath-work and down-regulation of nervous system    Plan:   Planned Interventions include: therapeutic exercise, self-care home management, manual therapy, therapeutic activities, gait training, neuromuscular coordination, aquatic therapy  Patient and/or family understands and agrees with goals and plan documented: yes  Frequency: 2x/month  Duration: 2-4 months     Mignon Martinez, PT

## 2025-02-18 ASSESSMENT — ENCOUNTER SYMPTOMS
ARTHRALGIAS: 0
DYSURIA: 0
DIARRHEA: 0
FLATUS: 0
HEMATOCHEZIA: 0
VOMITING: 0
ABDOMINAL PAIN: 1
FEVER: 0
WEIGHT LOSS: 0
HEADACHES: 0
BELCHING: 0
ANOREXIA: 0
CONSTIPATION: 0
NAUSEA: 0
HEMATURIA: 0
FREQUENCY: 0
MYALGIAS: 0

## 2025-02-25 ENCOUNTER — OFFICE VISIT (OUTPATIENT)
Dept: OBSTETRICS AND GYNECOLOGY | Facility: CLINIC | Age: 30
End: 2025-02-25
Payer: COMMERCIAL

## 2025-02-25 VITALS
BODY MASS INDEX: 28.8 KG/M2 | WEIGHT: 195 LBS | SYSTOLIC BLOOD PRESSURE: 127 MMHG | HEART RATE: 117 BPM | DIASTOLIC BLOOD PRESSURE: 86 MMHG

## 2025-02-25 DIAGNOSIS — N80.9 ENDOMETRIOSIS DETERMINED BY LAPAROSCOPY: Primary | ICD-10-CM

## 2025-02-25 DIAGNOSIS — F41.8 ANXIETY ABOUT HEALTH: ICD-10-CM

## 2025-02-25 DIAGNOSIS — R10.2 PELVIC PAIN: ICD-10-CM

## 2025-02-25 PROCEDURE — 99215 OFFICE O/P EST HI 40 MIN: CPT | Performed by: STUDENT IN AN ORGANIZED HEALTH CARE EDUCATION/TRAINING PROGRAM
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
## 2025-03-06 ENCOUNTER — APPOINTMENT (OUTPATIENT)
Dept: PHYSICAL THERAPY | Facility: CLINIC | Age: 30
End: 2025-03-06
Payer: COMMERCIAL

## 2025-03-12 ENCOUNTER — APPOINTMENT (OUTPATIENT)
Dept: RADIOLOGY | Facility: HOSPITAL | Age: 30
End: 2025-03-12
Payer: COMMERCIAL

## 2025-03-14 ENCOUNTER — APPOINTMENT (OUTPATIENT)
Dept: RADIOLOGY | Facility: HOSPITAL | Age: 30
End: 2025-03-14
Payer: COMMERCIAL

## 2025-03-31 ENCOUNTER — HOSPITAL ENCOUNTER (OUTPATIENT)
Dept: RADIOLOGY | Facility: HOSPITAL | Age: 30
Discharge: HOME | End: 2025-03-31
Payer: COMMERCIAL

## 2025-03-31 DIAGNOSIS — N80.9 ENDOMETRIOSIS DETERMINED BY LAPAROSCOPY: ICD-10-CM

## 2025-03-31 DIAGNOSIS — R10.2 PELVIC PAIN: ICD-10-CM

## 2025-03-31 PROCEDURE — 72197 MRI PELVIS W/O & W/DYE: CPT | Performed by: RADIOLOGY

## 2025-03-31 PROCEDURE — A9575 INJ GADOTERATE MEGLUMI 0.1ML: HCPCS | Mod: SE | Performed by: STUDENT IN AN ORGANIZED HEALTH CARE EDUCATION/TRAINING PROGRAM

## 2025-03-31 PROCEDURE — 72197 MRI PELVIS W/O & W/DYE: CPT

## 2025-03-31 PROCEDURE — 2550000001 HC RX 255 CONTRASTS: Mod: SE | Performed by: STUDENT IN AN ORGANIZED HEALTH CARE EDUCATION/TRAINING PROGRAM

## 2025-03-31 RX ORDER — GADOTERATE MEGLUMINE 376.9 MG/ML
20 INJECTION INTRAVENOUS
Status: COMPLETED | OUTPATIENT
Start: 2025-03-31 | End: 2025-03-31

## 2025-03-31 RX ADMIN — GADOTERATE MEGLUMINE 17 ML: 376.9 INJECTION INTRAVENOUS at 12:34

## 2025-04-30 ENCOUNTER — APPOINTMENT (OUTPATIENT)
Dept: BEHAVIORAL HEALTH | Facility: CLINIC | Age: 30
End: 2025-04-30
Payer: COMMERCIAL

## 2025-05-01 DIAGNOSIS — G43.709 CHRONIC MIGRAINE WITHOUT AURA WITHOUT STATUS MIGRAINOSUS, NOT INTRACTABLE: ICD-10-CM

## 2025-05-01 RX ORDER — TOPIRAMATE 50 MG/1
50 TABLET, FILM COATED ORAL 2 TIMES DAILY
Qty: 180 TABLET | Refills: 1 | Status: SHIPPED | OUTPATIENT
Start: 2025-05-01

## 2025-05-28 ENCOUNTER — APPOINTMENT (OUTPATIENT)
Dept: BEHAVIORAL HEALTH | Facility: CLINIC | Age: 30
End: 2025-05-28
Payer: COMMERCIAL

## 2025-05-28 DIAGNOSIS — N80.9 ENDOMETRIOSIS DETERMINED BY LAPAROSCOPY: ICD-10-CM

## 2025-05-28 DIAGNOSIS — R10.2 PELVIC PAIN: ICD-10-CM

## 2025-05-28 DIAGNOSIS — F41.8 ANXIETY ABOUT HEALTH: ICD-10-CM

## 2025-05-28 PROCEDURE — 99205 OFFICE O/P NEW HI 60 MIN: CPT | Performed by: CLINICAL NURSE SPECIALIST
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
## 2025-08-03 DIAGNOSIS — G43.709 CHRONIC MIGRAINE WITHOUT AURA WITHOUT STATUS MIGRAINOSUS, NOT INTRACTABLE: ICD-10-CM

## 2025-08-04 RX ORDER — SUMATRIPTAN SUCCINATE 100 MG/1
TABLET ORAL
Qty: 27 TABLET | Refills: 2 | OUTPATIENT
Start: 2025-08-04

## 2025-08-04 NOTE — TELEPHONE ENCOUNTER
Patient has not been seen in over 1 year (4/14/24).  Must be seen in office before refills can be given.

## 2025-08-07 ENCOUNTER — OFFICE VISIT (OUTPATIENT)
Dept: PRIMARY CARE | Facility: CLINIC | Age: 30
End: 2025-08-07
Payer: COMMERCIAL

## 2025-08-07 VITALS
SYSTOLIC BLOOD PRESSURE: 128 MMHG | WEIGHT: 186.2 LBS | TEMPERATURE: 97.1 F | DIASTOLIC BLOOD PRESSURE: 74 MMHG | HEIGHT: 69 IN | OXYGEN SATURATION: 98 % | HEART RATE: 107 BPM | BODY MASS INDEX: 27.58 KG/M2 | RESPIRATION RATE: 18 BRPM

## 2025-08-07 DIAGNOSIS — G43.909 ACUTE MIGRAINE: Primary | ICD-10-CM

## 2025-08-07 DIAGNOSIS — G43.709 CHRONIC MIGRAINE WITHOUT AURA WITHOUT STATUS MIGRAINOSUS, NOT INTRACTABLE: ICD-10-CM

## 2025-08-07 PROCEDURE — 99213 OFFICE O/P EST LOW 20 MIN: CPT | Performed by: NURSE PRACTITIONER

## 2025-08-07 PROCEDURE — 3008F BODY MASS INDEX DOCD: CPT | Performed by: NURSE PRACTITIONER

## 2025-08-07 RX ORDER — TOPIRAMATE 50 MG/1
50 TABLET, FILM COATED ORAL 2 TIMES DAILY
Qty: 180 TABLET | Refills: 1 | Status: SHIPPED | OUTPATIENT
Start: 2025-08-07

## 2025-08-07 RX ORDER — SUMATRIPTAN SUCCINATE 100 MG/1
TABLET ORAL
Qty: 27 TABLET | Refills: 2 | Status: SHIPPED | OUTPATIENT
Start: 2025-08-07

## 2025-08-07 ASSESSMENT — PAIN SCALES - GENERAL: PAINLEVEL_OUTOF10: 0-NO PAIN

## 2025-08-07 ASSESSMENT — LIFESTYLE VARIABLES
SKIP TO QUESTIONS 9-10: 1
HOW OFTEN DO YOU HAVE A DRINK CONTAINING ALCOHOL: MONTHLY OR LESS
HOW MANY STANDARD DRINKS CONTAINING ALCOHOL DO YOU HAVE ON A TYPICAL DAY: 1 OR 2
AUDIT-C TOTAL SCORE: 1
HOW OFTEN DO YOU HAVE SIX OR MORE DRINKS ON ONE OCCASION: NEVER

## 2025-08-07 ASSESSMENT — PATIENT HEALTH QUESTIONNAIRE - PHQ9
2. FEELING DOWN, DEPRESSED OR HOPELESS: NOT AT ALL
SUM OF ALL RESPONSES TO PHQ9 QUESTIONS 1 AND 2: 0
1. LITTLE INTEREST OR PLEASURE IN DOING THINGS: NOT AT ALL

## 2025-08-07 NOTE — PROGRESS NOTES
"Subjective   Patient ID: Phyllis Mcmillan is a 30 y.o. female who presents for Migraine (PT IS HERE FOR REFILL SUMATRIPTAN ).    HERE FOR MEDS FOOR MIGRANES, VERY SPOATIC , MORE WEATHER RELATED SOME  STRESS, MUCH LESSOFTEN  MEDS WORK    Migraine   This is a chronic problem. The current episode started more than 1 year ago. The problem occurs intermittently. The problem has been gradually improving. The pain is moderate.        Review of Systems   All other systems reviewed and are negative.      Objective   /74   Pulse 107   Temp 36.2 °C (97.1 °F)   Resp 18   Ht 1.753 m (5' 9\")   Wt 84.5 kg (186 lb 3.2 oz)   SpO2 98%   BMI 27.50 kg/m²     Physical Exam  Constitutional:       Appearance: Normal appearance.     Cardiovascular:      Rate and Rhythm: Normal rate and regular rhythm.     Neurological:      Mental Status: She is alert and oriented to person, place, and time.      Comments: MEDSWORK MUCH LESS OFTEN , CONTROLLED     Psychiatric:         Behavior: Behavior normal.         Assessment/Plan   Problem List Items Addressed This Visit           ICD-10-CM    Chronic migraine without aura without status migrainosus, not intractable G43.709    Relevant Medications    topiramate (Topamax) 50 mg tablet    SUMAtriptan (Imitrex) 100 mg tablet     Other Visit Diagnoses         Codes      Acute migraine    -  Primary G43.909        Glad meds have helped and you are having less migranes. Refilled . Callif  things change       "